# Patient Record
Sex: MALE | Race: WHITE | Employment: OTHER | URBAN - METROPOLITAN AREA
[De-identification: names, ages, dates, MRNs, and addresses within clinical notes are randomized per-mention and may not be internally consistent; named-entity substitution may affect disease eponyms.]

---

## 2023-10-13 LAB — DIABETIC RETINOPATHY: NEGATIVE

## 2023-12-13 LAB
CREATININE, URINE, EXTERNAL: 161.6
MICROALBUMIN URINE, EXTERNAL: 254.6
MICROALBUMIN/CREAT UR: 158 MG/G{CREAT}

## 2024-06-21 LAB
BUN BLDV-MCNC: 17 MG/DL
CALCIUM SERPL-MCNC: 9.5 MG/DL
CHLORIDE BLD-SCNC: 104 MMOL/L
CO2: 27 MMOL/L
CREAT SERPL-MCNC: 0.91 MG/DL
EGFR: 93
GLUCOSE BLD-MCNC: 165 MG/DL
POTASSIUM SERPL-SCNC: 4 MMOL/L
SODIUM BLD-SCNC: 139 MMOL/L

## 2024-06-22 LAB
CHOLESTEROL, TOTAL: 158 MG/DL
CHOLESTEROL/HDL RATIO: ABNORMAL
ESTIMATED AVERAGE GLUCOSE: 203
HBA1C MFR BLD: 8.7 %
HDLC SERPL-MCNC: 32 MG/DL (ref 35–70)
LDL CHOLESTEROL: 88
NONHDLC SERPL-MCNC: 126 MG/DL
TRIGL SERPL-MCNC: 188 MG/DL
VLDLC SERPL CALC-MCNC: ABNORMAL MG/DL

## 2024-07-15 ENCOUNTER — HOSPITAL ENCOUNTER (EMERGENCY)
Age: 69
Discharge: HOME | End: 2024-07-16
Attending: EMERGENCY MEDICINE

## 2024-07-15 ENCOUNTER — APPOINTMENT (OUTPATIENT)
Age: 69
End: 2024-07-15

## 2024-07-15 DIAGNOSIS — R31.9 HEMATURIA, UNSPECIFIED TYPE: ICD-10-CM

## 2024-07-15 DIAGNOSIS — R10.30 LOWER ABDOMINAL PAIN: Primary | ICD-10-CM

## 2024-07-15 LAB
ALBUMIN SERPL-MCNC: 3.9 G/DL (ref 3.4–5)
ALBUMIN/GLOB SERPL: 1.1 {RATIO}
ALP SERPL-CCNC: 101 U/L (ref 40–129)
ALT SERPL-CCNC: 19 U/L (ref 10–40)
ANION GAP SERPL CALCULATED.3IONS-SCNC: 12 MMOL/L (ref 3–16)
AST SERPL-CCNC: 19 U/L (ref 15–37)
BASOPHILS # BLD: 0.06 K/UL (ref 0–0.2)
BASOPHILS NFR BLD: 1 %
BILIRUB SERPL-MCNC: 0.5 MG/DL (ref 0–1)
BILIRUB UR QL STRIP: NEGATIVE
BUN SERPL-MCNC: 15 MG/DL (ref 7–20)
CALCIUM SERPL-MCNC: 8.9 MG/DL (ref 8.3–10.6)
CHARACTER UR: ABNORMAL
CHLORIDE SERPL-SCNC: 103 MMOL/L (ref 99–110)
CLARITY UR: ABNORMAL
CO2 SERPL-SCNC: 24 MMOL/L (ref 21–32)
COLOR UR: YELLOW
CREAT SERPL-MCNC: 0.8 MG/DL (ref 0.8–1.3)
EOSINOPHIL # BLD: 0.24 K/UL (ref 0–0.6)
EOSINOPHILS RELATIVE PERCENT: 2 %
ERYTHROCYTE [DISTWIDTH] IN BLOOD BY AUTOMATED COUNT: 12.3 % (ref 12.4–15.4)
GFR, ESTIMATED: >90 ML/MIN/1.73M2
GLUCOSE SERPL-MCNC: 176 MG/DL (ref 70–99)
GLUCOSE UR STRIP-MCNC: 100 MG/DL
HCT VFR BLD AUTO: 39.7 % (ref 40.5–52.5)
HGB BLD-MCNC: 13.9 G/DL (ref 13.5–17.5)
HGB UR QL STRIP.AUTO: ABNORMAL
IMM GRANULOCYTES # BLD AUTO: 0.01 K/UL (ref 0–0.5)
IMM GRANULOCYTES NFR BLD: 0 %
KETONES UR STRIP-MCNC: NEGATIVE MG/DL
LEUKOCYTE ESTERASE UR QL STRIP: NEGATIVE
LIPASE SERPL-CCNC: 56 U/L (ref 13–60)
LYMPHOCYTES NFR BLD: 3.5 K/UL (ref 1–5.1)
LYMPHOCYTES RELATIVE PERCENT: 36 %
MCH RBC QN AUTO: 30.8 PG (ref 26–34)
MCHC RBC AUTO-ENTMCNC: 35 G/DL (ref 31–36)
MCV RBC AUTO: 88 FL (ref 80–100)
MONOCYTES NFR BLD: 0.75 K/UL (ref 0–1.3)
MONOCYTES NFR BLD: 8 %
NEUTROPHILS NFR BLD: 54 %
NEUTS SEG NFR BLD: 5.29 K/UL (ref 1.7–7.7)
NITRITE UR QL STRIP: NEGATIVE
PH UR STRIP: 5.5 [PH] (ref 5–8)
PLATELET # BLD AUTO: 201 K/UL (ref 135–450)
PMV BLD AUTO: 9.9 FL
POTASSIUM SERPL-SCNC: 4.2 MMOL/L (ref 3.5–5.1)
PROT SERPL-MCNC: 7.4 G/DL (ref 6.4–8.2)
PROT UR STRIP-MCNC: 100 MG/DL
RBC # BLD AUTO: 4.51 M/UL (ref 4.2–5.9)
RBC #/AREA URNS HPF: ABNORMAL /HPF
SODIUM SERPL-SCNC: 139 MMOL/L (ref 136–145)
SP GR UR STRIP: >1.03 (ref 1–1.03)
UROBILINOGEN UR STRIP-ACNC: 2 EU/DL (ref 0–1)
WBC #/AREA URNS HPF: ABNORMAL /HPF
WBC OTHER # BLD: 9.9 K/UL (ref 4–11)

## 2024-07-15 PROCEDURE — 81001 URINALYSIS AUTO W/SCOPE: CPT

## 2024-07-15 PROCEDURE — 99284 EMERGENCY DEPT VISIT MOD MDM: CPT

## 2024-07-15 PROCEDURE — 83690 ASSAY OF LIPASE: CPT

## 2024-07-15 PROCEDURE — 74176 CT ABD & PELVIS W/O CONTRAST: CPT

## 2024-07-15 PROCEDURE — 80053 COMPREHEN METABOLIC PANEL: CPT

## 2024-07-15 PROCEDURE — 51798 US URINE CAPACITY MEASURE: CPT

## 2024-07-15 PROCEDURE — 51702 INSERT TEMP BLADDER CATH: CPT

## 2024-07-15 PROCEDURE — 85025 COMPLETE CBC W/AUTO DIFF WBC: CPT

## 2024-07-15 PROCEDURE — 6370000000 HC RX 637 (ALT 250 FOR IP): Performed by: EMERGENCY MEDICINE

## 2024-07-15 RX ORDER — LIDOCAINE HYDROCHLORIDE 20 MG/ML
JELLY TOPICAL ONCE
Status: COMPLETED | OUTPATIENT
Start: 2024-07-15 | End: 2024-07-15

## 2024-07-15 RX ORDER — ATORVASTATIN CALCIUM 40 MG/1
40 TABLET, FILM COATED ORAL DAILY
COMMUNITY
Start: 2023-11-29

## 2024-07-15 RX ORDER — ASPIRIN 325 MG
325 TABLET ORAL DAILY
COMMUNITY
Start: 2023-11-13

## 2024-07-15 RX ORDER — CARVEDILOL 6.25 MG/1
6.25 TABLET ORAL 2 TIMES DAILY WITH MEALS
COMMUNITY
Start: 2024-05-08

## 2024-07-15 RX ORDER — LISINOPRIL 10 MG/1
10 TABLET ORAL DAILY
COMMUNITY
Start: 2023-11-29

## 2024-07-15 RX ORDER — 0.9 % SODIUM CHLORIDE 0.9 %
500 INTRAVENOUS SOLUTION INTRAVENOUS ONCE
Status: DISCONTINUED | OUTPATIENT
Start: 2024-07-16 | End: 2024-07-16

## 2024-07-15 RX ORDER — PHENAZOPYRIDINE HYDROCHLORIDE 100 MG/1
200 TABLET, FILM COATED ORAL ONCE
Status: COMPLETED | OUTPATIENT
Start: 2024-07-16 | End: 2024-07-16

## 2024-07-15 RX ORDER — MORPHINE SULFATE 2 MG/ML
2 INJECTION, SOLUTION INTRAMUSCULAR; INTRAVENOUS ONCE
Status: DISCONTINUED | OUTPATIENT
Start: 2024-07-16 | End: 2024-07-16

## 2024-07-15 RX ORDER — TAMSULOSIN HYDROCHLORIDE 0.4 MG/1
0.4 CAPSULE ORAL DAILY
COMMUNITY
Start: 2023-11-29

## 2024-07-15 RX ADMIN — LIDOCAINE HYDROCHLORIDE: 20 JELLY TOPICAL at 22:36

## 2024-07-15 ASSESSMENT — PAIN DESCRIPTION - ORIENTATION: ORIENTATION: LEFT;UPPER

## 2024-07-15 ASSESSMENT — PAIN DESCRIPTION - FREQUENCY: FREQUENCY: CONTINUOUS

## 2024-07-15 ASSESSMENT — PAIN DESCRIPTION - LOCATION: LOCATION: CHEST;NECK

## 2024-07-15 ASSESSMENT — LIFESTYLE VARIABLES
HOW OFTEN DO YOU HAVE A DRINK CONTAINING ALCOHOL: NEVER
HOW MANY STANDARD DRINKS CONTAINING ALCOHOL DO YOU HAVE ON A TYPICAL DAY: PATIENT DOES NOT DRINK

## 2024-07-15 ASSESSMENT — PAIN - FUNCTIONAL ASSESSMENT
PAIN_FUNCTIONAL_ASSESSMENT: ACTIVITIES ARE NOT PREVENTED
PAIN_FUNCTIONAL_ASSESSMENT: NONE - DENIES PAIN

## 2024-07-15 ASSESSMENT — PAIN DESCRIPTION - PAIN TYPE: TYPE: ACUTE PAIN

## 2024-07-15 ASSESSMENT — PAIN DESCRIPTION - DESCRIPTORS: DESCRIPTORS: TIGHTNESS

## 2024-07-15 ASSESSMENT — PAIN DESCRIPTION - ONSET: ONSET: ON-GOING

## 2024-07-15 ASSESSMENT — PAIN SCALES - GENERAL: PAINLEVEL_OUTOF10: 4

## 2024-07-16 VITALS
BODY MASS INDEX: 29.24 KG/M2 | HEIGHT: 67 IN | SYSTOLIC BLOOD PRESSURE: 167 MMHG | DIASTOLIC BLOOD PRESSURE: 86 MMHG | RESPIRATION RATE: 18 BRPM | HEART RATE: 65 BPM | TEMPERATURE: 98.4 F | OXYGEN SATURATION: 96 % | WEIGHT: 186.3 LBS

## 2024-07-16 PROCEDURE — 6370000000 HC RX 637 (ALT 250 FOR IP): Performed by: EMERGENCY MEDICINE

## 2024-07-16 RX ORDER — PHENAZOPYRIDINE HYDROCHLORIDE 200 MG/1
200 TABLET, FILM COATED ORAL 3 TIMES DAILY PRN
Qty: 6 TABLET | Refills: 0 | Status: SHIPPED | OUTPATIENT
Start: 2024-07-16

## 2024-07-16 RX ORDER — OXYCODONE HYDROCHLORIDE AND ACETAMINOPHEN 5; 325 MG/1; MG/1
1 TABLET ORAL ONCE
Status: COMPLETED | OUTPATIENT
Start: 2024-07-16 | End: 2024-07-16

## 2024-07-16 RX ORDER — OXYCODONE HYDROCHLORIDE AND ACETAMINOPHEN 5; 325 MG/1; MG/1
1 TABLET ORAL EVERY 6 HOURS PRN
Qty: 12 TABLET | Refills: 0 | Status: SHIPPED | OUTPATIENT
Start: 2024-07-16 | End: 2024-07-19

## 2024-07-16 RX ORDER — CIPROFLOXACIN 500 MG/1
500 TABLET, FILM COATED ORAL 2 TIMES DAILY
Qty: 14 TABLET | Refills: 0 | Status: SHIPPED | OUTPATIENT
Start: 2024-07-16 | End: 2024-07-23

## 2024-07-16 RX ADMIN — PHENAZOPYRIDINE HYDROCHLORIDE 200 MG: 100 TABLET ORAL at 00:09

## 2024-07-16 RX ADMIN — OXYCODONE HYDROCHLORIDE AND ACETAMINOPHEN 1 TABLET: 5; 325 TABLET ORAL at 00:15

## 2024-07-16 NOTE — ED PROVIDER NOTES
[facility] EMERGENCY DEPT     EMERGENCY DEPARTMENT ENCOUNTER            Pt Name: Matt Esteban   MRN: [id number]   Birthdate 1955   Date of evaluation: 7/15/2024   Provider: Radha Sanchez DO   PCP: Galo Stewart MD   Note Started: 5:34 AM EDT 7/16/24          CHIEF COMPLAINT     Chief Complaint   Patient presents with    Urinary Retention     Pt presents to the ED with c/o inability to pee. Pt reports hx of frequent UTIs and \"prostate issues\".     Abdominal Pain             HISTORY OF PRESENT ILLNESS:   History from : Patient   Limitations to history : None     Matt Esteban is a 68 y.o. male who presents to emergency department with complaints of lower abdominal pain urinary frequency and difficulty voiding onset 3 days ago constant since that time.  Patient reports a weak urinary stream for more than a year.  He states over the last 3 days however he has been urinating more frequently and his urine stream is much weaker than usual but he has been able to pass urine..  He also reports pain with urination.  He denies fever or vomiting.  He does report associated occasional low back pain.  He states he has never seen urologist.    Nursing Notes were all reviewed and agreed with, or any disagreements were addressed in the HPI.     REVIEW OF SYSTEMS :    Positives and Pertinent negatives as per HPI.      MEDICAL HISTORY   has a past medical history of UTI (urinary tract infection).    Past Surgical History:   Procedure Laterality Date    APPENDECTOMY      CHOLECYSTECTOMY        CURRENTMEDICATIONS       Discharge Medication List as of 7/16/2024 12:23 AM        CONTINUE these medications which have NOT CHANGED    Details   metFORMIN (GLUCOPHAGE) 500 MG tablet Take 1 tablet by mouth 2 times daily (with meals)Historical Med      tamsulosin (FLOMAX) 0.4 MG capsule Take 1 capsule by mouth dailyHistorical Med      lisinopril (PRINIVIL;ZESTRIL) 10 MG tablet Take 1 tablet by mouth dailyHistorical Med

## 2024-07-16 NOTE — DISCHARGE INSTRUCTIONS
Return to emergency department if worsening abdominal pain fever vomiting difficulty urinating worse in any way or any problems.

## 2024-07-16 NOTE — ED NOTES

## 2024-12-03 ENCOUNTER — OFFICE VISIT (OUTPATIENT)
Dept: PRIMARY CARE CLINIC | Age: 69
End: 2024-12-03
Payer: MEDICARE

## 2024-12-03 VITALS
OXYGEN SATURATION: 95 % | HEART RATE: 79 BPM | DIASTOLIC BLOOD PRESSURE: 70 MMHG | SYSTOLIC BLOOD PRESSURE: 122 MMHG | RESPIRATION RATE: 18 BRPM | BODY MASS INDEX: 29.48 KG/M2 | TEMPERATURE: 97.8 F | WEIGHT: 188.2 LBS

## 2024-12-03 DIAGNOSIS — G89.29 CHRONIC BILATERAL LOW BACK PAIN WITHOUT SCIATICA: ICD-10-CM

## 2024-12-03 DIAGNOSIS — E78.2 MIXED HYPERLIPIDEMIA: ICD-10-CM

## 2024-12-03 DIAGNOSIS — R33.9 URINARY RETENTION: ICD-10-CM

## 2024-12-03 DIAGNOSIS — E11.9 TYPE 2 DIABETES MELLITUS WITHOUT COMPLICATION, WITHOUT LONG-TERM CURRENT USE OF INSULIN (HCC): Primary | ICD-10-CM

## 2024-12-03 DIAGNOSIS — N13.8 BENIGN PROSTATIC HYPERPLASIA WITH URINARY OBSTRUCTION: ICD-10-CM

## 2024-12-03 DIAGNOSIS — Z12.5 ENCOUNTER FOR SCREENING FOR MALIGNANT NEOPLASM OF PROSTATE: ICD-10-CM

## 2024-12-03 DIAGNOSIS — R39.15 URINARY URGENCY: ICD-10-CM

## 2024-12-03 DIAGNOSIS — M54.50 CHRONIC BILATERAL LOW BACK PAIN WITHOUT SCIATICA: ICD-10-CM

## 2024-12-03 DIAGNOSIS — N40.1 BENIGN PROSTATIC HYPERPLASIA WITH URINARY OBSTRUCTION: ICD-10-CM

## 2024-12-03 PROBLEM — I25.83 CORONARY ARTERY DISEASE DUE TO LIPID RICH PLAQUE: Status: ACTIVE | Noted: 2022-12-21

## 2024-12-03 PROBLEM — R91.1 SOLITARY PULMONARY NODULE: Status: ACTIVE | Noted: 2024-01-04

## 2024-12-03 PROBLEM — R42 DIZZINESS AND GIDDINESS: Status: RESOLVED | Noted: 2024-01-10 | Resolved: 2024-12-03

## 2024-12-03 PROBLEM — R42 DIZZINESS AND GIDDINESS: Status: ACTIVE | Noted: 2024-01-10

## 2024-12-03 PROBLEM — I10 ESSENTIAL HYPERTENSION: Status: ACTIVE | Noted: 2022-12-21

## 2024-12-03 PROBLEM — Z72.0 TOBACCO ABUSE: Status: ACTIVE | Noted: 2022-12-21

## 2024-12-03 PROBLEM — I25.10 CORONARY ARTERY DISEASE DUE TO LIPID RICH PLAQUE: Status: ACTIVE | Noted: 2022-12-21

## 2024-12-03 LAB
ALBUMIN SERPL-MCNC: 4.3 G/DL (ref 3.4–5)
ALBUMIN/GLOB SERPL: 1.3 {RATIO} (ref 1.1–2.2)
ALP SERPL-CCNC: 94 U/L (ref 40–129)
ALT SERPL-CCNC: 25 U/L (ref 10–40)
ANION GAP SERPL CALCULATED.3IONS-SCNC: 9 MMOL/L (ref 3–16)
AST SERPL-CCNC: 20 U/L (ref 15–37)
BACTERIA URNS QL MICRO: ABNORMAL /HPF
BILIRUB SERPL-MCNC: 1.1 MG/DL (ref 0–1)
BILIRUB UR QL STRIP.AUTO: NEGATIVE
BUN SERPL-MCNC: 22 MG/DL (ref 7–20)
CALCIUM SERPL-MCNC: 9.6 MG/DL (ref 8.3–10.6)
CHLORIDE SERPL-SCNC: 103 MMOL/L (ref 99–110)
CHOLEST SERPL-MCNC: 190 MG/DL (ref 0–199)
CLARITY UR: ABNORMAL
CO2 SERPL-SCNC: 27 MMOL/L (ref 21–32)
COLOR UR: YELLOW
CREAT SERPL-MCNC: 0.8 MG/DL (ref 0.8–1.3)
CREAT UR-MCNC: 218 MG/DL (ref 39–259)
DEPRECATED RDW RBC AUTO: 13.7 % (ref 12.4–15.4)
EPI CELLS #/AREA URNS AUTO: 1 /HPF (ref 0–5)
EST. AVERAGE GLUCOSE BLD GHB EST-MCNC: 177.2 MG/DL
FOLATE SERPL-MCNC: 7.15 NG/ML (ref 4.78–24.2)
GFR SERPLBLD CREATININE-BSD FMLA CKD-EPI: >90 ML/MIN/{1.73_M2}
GLUCOSE SERPL-MCNC: 183 MG/DL (ref 70–99)
GLUCOSE UR STRIP.AUTO-MCNC: NEGATIVE MG/DL
HBA1C MFR BLD: 7.8 %
HCT VFR BLD AUTO: 42.9 % (ref 40.5–52.5)
HCV AB SERPL QL IA: NORMAL
HDLC SERPL-MCNC: 34 MG/DL (ref 40–60)
HGB BLD-MCNC: 15.1 G/DL (ref 13.5–17.5)
HGB UR QL STRIP.AUTO: NEGATIVE
HYALINE CASTS #/AREA URNS AUTO: 1 /LPF (ref 0–8)
KETONES UR STRIP.AUTO-MCNC: NEGATIVE MG/DL
LDLC SERPL CALC-MCNC: 125 MG/DL
LEUKOCYTE ESTERASE UR QL STRIP.AUTO: NEGATIVE
MCH RBC QN AUTO: 31.6 PG (ref 26–34)
MCHC RBC AUTO-ENTMCNC: 35.3 G/DL (ref 31–36)
MCV RBC AUTO: 89.6 FL (ref 80–100)
MICROALBUMIN UR DL<=1MG/L-MCNC: 126 MG/DL
MICROALBUMIN/CREAT UR: 578 MG/G (ref 0–30)
NITRITE UR QL STRIP.AUTO: NEGATIVE
PH UR STRIP.AUTO: 5 [PH] (ref 5–8)
PLATELET # BLD AUTO: 174 K/UL (ref 135–450)
PMV BLD AUTO: 10.3 FL (ref 5–10.5)
POTASSIUM SERPL-SCNC: 5.2 MMOL/L (ref 3.5–5.1)
PROT SERPL-MCNC: 7.6 G/DL (ref 6.4–8.2)
PROT UR STRIP.AUTO-MCNC: 300 MG/DL
PSA SERPL DL<=0.01 NG/ML-MCNC: 2.18 NG/ML (ref 0–4)
RBC # BLD AUTO: 4.78 M/UL (ref 4.2–5.9)
RBC CLUMPS #/AREA URNS AUTO: 1 /HPF (ref 0–4)
SODIUM SERPL-SCNC: 139 MMOL/L (ref 136–145)
SP GR UR STRIP.AUTO: 1.03 (ref 1–1.03)
TRIGL SERPL-MCNC: 155 MG/DL (ref 0–150)
TSH SERPL DL<=0.005 MIU/L-ACNC: 1.46 UIU/ML (ref 0.27–4.2)
UA DIPSTICK W REFLEX MICRO PNL UR: YES
URN SPEC COLLECT METH UR: ABNORMAL
UROBILINOGEN UR STRIP-ACNC: 0.2 E.U./DL
VIT B12 SERPL-MCNC: 668 PG/ML (ref 211–911)
VLDLC SERPL CALC-MCNC: 31 MG/DL
WBC # BLD AUTO: 8.9 K/UL (ref 4–11)
WBC #/AREA URNS AUTO: 1 /HPF (ref 0–5)

## 2024-12-03 PROCEDURE — 99205 OFFICE O/P NEW HI 60 MIN: CPT | Performed by: FAMILY MEDICINE

## 2024-12-03 PROCEDURE — 3078F DIAST BP <80 MM HG: CPT | Performed by: FAMILY MEDICINE

## 2024-12-03 PROCEDURE — 3051F HG A1C>EQUAL 7.0%<8.0%: CPT | Performed by: FAMILY MEDICINE

## 2024-12-03 PROCEDURE — 1123F ACP DISCUSS/DSCN MKR DOCD: CPT | Performed by: FAMILY MEDICINE

## 2024-12-03 PROCEDURE — 36415 COLL VENOUS BLD VENIPUNCTURE: CPT | Performed by: FAMILY MEDICINE

## 2024-12-03 PROCEDURE — 3074F SYST BP LT 130 MM HG: CPT | Performed by: FAMILY MEDICINE

## 2024-12-03 RX ORDER — GLYBURIDE 5 MG/1
5 TABLET ORAL
COMMUNITY
Start: 2024-10-21 | End: 2024-12-03

## 2024-12-03 RX ORDER — TIZANIDINE 2 MG/1
2 TABLET ORAL 3 TIMES DAILY PRN
Qty: 60 TABLET | Refills: 0 | Status: SHIPPED | OUTPATIENT
Start: 2024-12-03

## 2024-12-03 RX ORDER — AZELASTINE HYDROCHLORIDE 137 UG/1
1 SPRAY, METERED NASAL 2 TIMES DAILY
COMMUNITY
Start: 2024-07-03

## 2024-12-03 SDOH — ECONOMIC STABILITY: INCOME INSECURITY: HOW HARD IS IT FOR YOU TO PAY FOR THE VERY BASICS LIKE FOOD, HOUSING, MEDICAL CARE, AND HEATING?: NOT HARD AT ALL

## 2024-12-03 SDOH — ECONOMIC STABILITY: FOOD INSECURITY: WITHIN THE PAST 12 MONTHS, THE FOOD YOU BOUGHT JUST DIDN'T LAST AND YOU DIDN'T HAVE MONEY TO GET MORE.: NEVER TRUE

## 2024-12-03 SDOH — ECONOMIC STABILITY: FOOD INSECURITY: WITHIN THE PAST 12 MONTHS, YOU WORRIED THAT YOUR FOOD WOULD RUN OUT BEFORE YOU GOT MONEY TO BUY MORE.: NEVER TRUE

## 2024-12-03 ASSESSMENT — PATIENT HEALTH QUESTIONNAIRE - PHQ9
SUM OF ALL RESPONSES TO PHQ QUESTIONS 1-9: 9
5. POOR APPETITE OR OVEREATING: NOT AT ALL
1. LITTLE INTEREST OR PLEASURE IN DOING THINGS: SEVERAL DAYS
8. MOVING OR SPEAKING SO SLOWLY THAT OTHER PEOPLE COULD HAVE NOTICED. OR THE OPPOSITE, BEING SO FIGETY OR RESTLESS THAT YOU HAVE BEEN MOVING AROUND A LOT MORE THAN USUAL: NOT AT ALL
9. THOUGHTS THAT YOU WOULD BE BETTER OFF DEAD, OR OF HURTING YOURSELF: NOT AT ALL
10. IF YOU CHECKED OFF ANY PROBLEMS, HOW DIFFICULT HAVE THESE PROBLEMS MADE IT FOR YOU TO DO YOUR WORK, TAKE CARE OF THINGS AT HOME, OR GET ALONG WITH OTHER PEOPLE: SOMEWHAT DIFFICULT
7. TROUBLE CONCENTRATING ON THINGS, SUCH AS READING THE NEWSPAPER OR WATCHING TELEVISION: NOT AT ALL
SUM OF ALL RESPONSES TO PHQ QUESTIONS 1-9: 9
2. FEELING DOWN, DEPRESSED OR HOPELESS: MORE THAN HALF THE DAYS
4. FEELING TIRED OR HAVING LITTLE ENERGY: MORE THAN HALF THE DAYS
SUM OF ALL RESPONSES TO PHQ QUESTIONS 1-9: 9
SUM OF ALL RESPONSES TO PHQ9 QUESTIONS 1 & 2: 3
6. FEELING BAD ABOUT YOURSELF - OR THAT YOU ARE A FAILURE OR HAVE LET YOURSELF OR YOUR FAMILY DOWN: SEVERAL DAYS
3. TROUBLE FALLING OR STAYING ASLEEP: NEARLY EVERY DAY
SUM OF ALL RESPONSES TO PHQ QUESTIONS 1-9: 9

## 2024-12-03 NOTE — PROGRESS NOTES
cervical adenopathy.   Neurological:      General: No focal deficit present.      Mental Status: He is alert and oriented to person, place, and time.      Cranial Nerves: No cranial nerve deficit.   Psychiatric:         Attention and Perception: Attention and perception normal.         Mood and Affect: Mood and affect normal.         Speech: Speech normal.         Behavior: Behavior normal. Behavior is cooperative.         Thought Content: Thought content normal.         Cognition and Memory: Cognition and memory normal.         Judgment: Judgment normal.         ASSESSMENT:  1. Type 2 diabetes mellitus without complication, without long-term current use of insulin (HCC)    2. Mixed hyperlipidemia    3. Urinary urgency    4. Benign prostatic hyperplasia with urinary obstruction    5. Chronic bilateral low back pain without sciatica    6. Encounter for screening for malignant neoplasm of prostate    7. Urinary retention         PLAN:   1. Type 2 diabetes mellitus without complication, without long-term current use of insulin (HCC)  -     Comprehensive Metabolic Panel  -     CBC  -     Lipid Panel  -     Hemoglobin A1C  -     PSA Screening  -     Hepatitis C Antibody  -     TSH with Reflex  -     Vitamin B12 & Folate  -     MICROALBUMIN / CREATININE URINE RATIO  2. Mixed hyperlipidemia  -     Comprehensive Metabolic Panel  -     CBC  -     Lipid Panel  -     Hemoglobin A1C  -     PSA Screening  -     Hepatitis C Antibody  -     TSH with Reflex  -     Vitamin B12 & Folate  3. Urinary urgency  -     Culture, Urine  -     Urinalysis with Microscopic  4. Benign prostatic hyperplasia with urinary obstruction  -     JOSE R - Aime Mohan MD, Urology, [place]  5. Chronic bilateral low back pain without sciatica  -     diclofenac sodium (VOLTAREN) 1 % GEL; Apply 2 g topically 4 times daily, Topical, 4 TIMES DAILY Starting Tue 12/3/2024, Disp-100 g, R-3, Normal  -     tiZANidine (ZANAFLEX) 2 MG tablet; Take 1 tablet by

## 2024-12-04 LAB — BACTERIA UR CULT: NORMAL

## 2024-12-05 DIAGNOSIS — E11.29 TYPE 2 DIABETES MELLITUS WITH DIABETIC MICROALBUMINURIA, WITHOUT LONG-TERM CURRENT USE OF INSULIN (HCC): Primary | ICD-10-CM

## 2024-12-05 DIAGNOSIS — R80.9 TYPE 2 DIABETES MELLITUS WITH DIABETIC MICROALBUMINURIA, WITHOUT LONG-TERM CURRENT USE OF INSULIN (HCC): Primary | ICD-10-CM

## 2024-12-05 RX ORDER — ATORVASTATIN CALCIUM 80 MG/1
80 TABLET, FILM COATED ORAL DAILY
Qty: 90 TABLET | Refills: 0 | Status: SHIPPED | OUTPATIENT
Start: 2024-12-05

## 2024-12-05 RX ORDER — LISINOPRIL 10 MG/1
10 TABLET ORAL DAILY
Qty: 90 TABLET | Refills: 0 | Status: SHIPPED | OUTPATIENT
Start: 2024-12-05

## 2025-01-06 SDOH — HEALTH STABILITY: PHYSICAL HEALTH: ON AVERAGE, HOW MANY DAYS PER WEEK DO YOU ENGAGE IN MODERATE TO STRENUOUS EXERCISE (LIKE A BRISK WALK)?: 7 DAYS

## 2025-01-06 SDOH — HEALTH STABILITY: PHYSICAL HEALTH: ON AVERAGE, HOW MANY MINUTES DO YOU ENGAGE IN EXERCISE AT THIS LEVEL?: 150+ MIN

## 2025-01-06 ASSESSMENT — PATIENT HEALTH QUESTIONNAIRE - PHQ9
SUM OF ALL RESPONSES TO PHQ QUESTIONS 1-9: 0
1. LITTLE INTEREST OR PLEASURE IN DOING THINGS: NOT AT ALL
SUM OF ALL RESPONSES TO PHQ QUESTIONS 1-9: 0
SUM OF ALL RESPONSES TO PHQ QUESTIONS 1-9: 0
2. FEELING DOWN, DEPRESSED OR HOPELESS: NOT AT ALL
SUM OF ALL RESPONSES TO PHQ9 QUESTIONS 1 & 2: 0
SUM OF ALL RESPONSES TO PHQ QUESTIONS 1-9: 0

## 2025-01-06 ASSESSMENT — LIFESTYLE VARIABLES
HOW OFTEN DURING THE LAST YEAR HAVE YOU BEEN UNABLE TO REMEMBER WHAT HAPPENED THE NIGHT BEFORE BECAUSE YOU HAD BEEN DRINKING: NEVER
HOW MANY STANDARD DRINKS CONTAINING ALCOHOL DO YOU HAVE ON A TYPICAL DAY: 1 OR 2
HOW OFTEN DURING THE LAST YEAR HAVE YOU FOUND THAT YOU WERE NOT ABLE TO STOP DRINKING ONCE YOU HAD STARTED: NEVER
HAVE YOU OR SOMEONE ELSE BEEN INJURED AS A RESULT OF YOUR DRINKING: NO
HAVE YOU OR SOMEONE ELSE BEEN INJURED AS A RESULT OF YOUR DRINKING: NO
HAS A RELATIVE, FRIEND, DOCTOR, OR ANOTHER HEALTH PROFESSIONAL EXPRESSED CONCERN ABOUT YOUR DRINKING OR SUGGESTED YOU CUT DOWN: NO
HOW MANY STANDARD DRINKS CONTAINING ALCOHOL DO YOU HAVE ON A TYPICAL DAY: 1
HOW OFTEN DURING THE LAST YEAR HAVE YOU NEEDED AN ALCOHOLIC DRINK FIRST THING IN THE MORNING TO GET YOURSELF GOING AFTER A NIGHT OF HEAVY DRINKING: NEVER
HOW OFTEN DURING THE LAST YEAR HAVE YOU HAD A FEELING OF GUILT OR REMORSE AFTER DRINKING: NEVER
HOW OFTEN DURING THE LAST YEAR HAVE YOU FAILED TO DO WHAT WAS NORMALLY EXPECTED FROM YOU BECAUSE OF DRINKING: NEVER
HOW OFTEN DO YOU HAVE A DRINK CONTAINING ALCOHOL: MONTHLY OR LESS
HOW OFTEN DURING THE LAST YEAR HAVE YOU HAD A FEELING OF GUILT OR REMORSE AFTER DRINKING: NEVER
HOW OFTEN DURING THE LAST YEAR HAVE YOU BEEN UNABLE TO REMEMBER WHAT HAPPENED THE NIGHT BEFORE BECAUSE YOU HAD BEEN DRINKING: NEVER
HOW OFTEN DO YOU HAVE A DRINK CONTAINING ALCOHOL: 2
HOW OFTEN DURING THE LAST YEAR HAVE YOU FOUND THAT YOU WERE NOT ABLE TO STOP DRINKING ONCE YOU HAD STARTED: NEVER
HOW OFTEN DURING THE LAST YEAR HAVE YOU NEEDED AN ALCOHOLIC DRINK FIRST THING IN THE MORNING TO GET YOURSELF GOING AFTER A NIGHT OF HEAVY DRINKING: NEVER
HOW OFTEN DO YOU HAVE SIX OR MORE DRINKS ON ONE OCCASION: 2
HOW OFTEN DURING THE LAST YEAR HAVE YOU FAILED TO DO WHAT WAS NORMALLY EXPECTED FROM YOU BECAUSE OF DRINKING: NEVER
HAS A RELATIVE, FRIEND, DOCTOR, OR ANOTHER HEALTH PROFESSIONAL EXPRESSED CONCERN ABOUT YOUR DRINKING OR SUGGESTED YOU CUT DOWN: NO

## 2025-01-09 ENCOUNTER — OFFICE VISIT (OUTPATIENT)
Dept: PRIMARY CARE CLINIC | Age: 70
End: 2025-01-09

## 2025-01-09 VITALS
HEIGHT: 67 IN | DIASTOLIC BLOOD PRESSURE: 88 MMHG | OXYGEN SATURATION: 97 % | BODY MASS INDEX: 29.66 KG/M2 | WEIGHT: 189 LBS | SYSTOLIC BLOOD PRESSURE: 138 MMHG | HEART RATE: 76 BPM | RESPIRATION RATE: 20 BRPM | TEMPERATURE: 97.8 F

## 2025-01-09 DIAGNOSIS — E11.9 TYPE 2 DIABETES MELLITUS WITHOUT COMPLICATION, WITHOUT LONG-TERM CURRENT USE OF INSULIN (HCC): ICD-10-CM

## 2025-01-09 DIAGNOSIS — Z12.11 SCREENING FOR MALIGNANT NEOPLASM OF COLON: ICD-10-CM

## 2025-01-09 DIAGNOSIS — I10 ESSENTIAL HYPERTENSION: ICD-10-CM

## 2025-01-09 DIAGNOSIS — Z87.891 PERSONAL HISTORY OF TOBACCO USE: ICD-10-CM

## 2025-01-09 DIAGNOSIS — Z00.00 WELCOME TO MEDICARE PREVENTIVE VISIT: Primary | ICD-10-CM

## 2025-01-09 DIAGNOSIS — E78.2 MIXED HYPERLIPIDEMIA: ICD-10-CM

## 2025-01-09 DIAGNOSIS — J01.00 ACUTE NON-RECURRENT MAXILLARY SINUSITIS: ICD-10-CM

## 2025-01-09 RX ORDER — AZITHROMYCIN 250 MG/1
TABLET, FILM COATED ORAL
Qty: 6 TABLET | Refills: 0 | Status: SHIPPED | OUTPATIENT
Start: 2025-01-09

## 2025-01-09 SDOH — ECONOMIC STABILITY: FOOD INSECURITY: WITHIN THE PAST 12 MONTHS, YOU WORRIED THAT YOUR FOOD WOULD RUN OUT BEFORE YOU GOT MONEY TO BUY MORE.: NEVER TRUE

## 2025-01-09 SDOH — ECONOMIC STABILITY: FOOD INSECURITY: WITHIN THE PAST 12 MONTHS, THE FOOD YOU BOUGHT JUST DIDN'T LAST AND YOU DIDN'T HAVE MONEY TO GET MORE.: NEVER TRUE

## 2025-01-09 NOTE — PROGRESS NOTES
Medicare Annual Wellness Visit    Matt Esteban is here for Medicare AWV    Assessment & Plan   Welcome to Medicare preventive visit  -     Visual acuity screening  Type 2 diabetes mellitus without complication, without long-term current use of insulin (HCC)  Mixed hyperlipidemia  -     Lipid Panel  -     Hepatic Function Panel  Screening for malignant neoplasm of colon  -     Cologuard (Fecal DNA Colorectal Cancer Screening)  Personal history of tobacco use  -     CO VISIT TO DISCUSS LUNG CA SCREEN W LDCT  -     CT Lung Screen (Annual); Future  Acute non-recurrent maxillary sinusitis  -     azithromycin (ZITHROMAX) 250 MG tablet; Take 2 tabs (500 mg) on Day 1, and take 1 tab (250 mg) on days 2 through 5., Disp-6 tablet, R-0Normal  Essential hypertension  -     lisinopril (PRINIVIL;ZESTRIL) 10 MG tablet; Take 1 tablet by mouth daily, Disp-90 tablet, R-0Normal  Do recommend nasal saline, vaporizer and other supportive measures for sinus infection  He does agree to complete Cologuard kit for colon cancer screening  Blood pressure is controlled in office today  Assess labs and adjust treatment for his lipids as needed  No change in diabetic regimen at this time     Return in about 3 months (around 4/9/2025).     Subjective   The following acute and/or chronic problems were also addressed today:    He was in his usual state of health until approximately 10 days ago when he had onset of nasal congestion slight sore throat and occasional cough.  Since that time symptoms had improved however congestion has worsened and now has pain across his face and into his teeth    Diabetes-he reports he was diagnosed in the 1990s initially nutrition changes were all that required but has been on metformin for some time now  He notes no difficulty with this medication  No other medications have been used  He notes his fasting blood sugars can vary and were much higher earlier in the year until he restarted his medication  Denies

## 2025-01-09 NOTE — PATIENT INSTRUCTIONS

## 2025-01-10 LAB
ALBUMIN SERPL-MCNC: 4.3 G/DL (ref 3.4–5)
ALP SERPL-CCNC: 107 U/L (ref 40–129)
ALT SERPL-CCNC: 28 U/L (ref 10–40)
AST SERPL-CCNC: 22 U/L (ref 15–37)
BILIRUB DIRECT SERPL-MCNC: 0.3 MG/DL (ref 0–0.3)
BILIRUB INDIRECT SERPL-MCNC: 0.5 MG/DL (ref 0–1)
BILIRUB SERPL-MCNC: 0.8 MG/DL (ref 0–1)
CHOLEST SERPL-MCNC: 192 MG/DL (ref 0–199)
HDLC SERPL-MCNC: 32 MG/DL (ref 40–60)
LDLC SERPL CALC-MCNC: 121 MG/DL
PROT SERPL-MCNC: 7.8 G/DL (ref 6.4–8.2)
TRIGL SERPL-MCNC: 196 MG/DL (ref 0–150)
VLDLC SERPL CALC-MCNC: 39 MG/DL

## 2025-01-15 DIAGNOSIS — E78.2 MIXED HYPERLIPIDEMIA: Primary | ICD-10-CM

## 2025-01-15 RX ORDER — ATORVASTATIN CALCIUM 80 MG/1
80 TABLET, FILM COATED ORAL DAILY
Qty: 90 TABLET | Refills: 1 | Status: SHIPPED | OUTPATIENT
Start: 2025-01-15

## 2025-01-15 RX ORDER — EZETIMIBE 10 MG/1
10 TABLET ORAL DAILY
Qty: 90 TABLET | Refills: 1 | Status: SHIPPED | OUTPATIENT
Start: 2025-01-15

## 2025-01-15 RX ORDER — LISINOPRIL 10 MG/1
10 TABLET ORAL DAILY
Qty: 90 TABLET | Refills: 0 | Status: SHIPPED | OUTPATIENT
Start: 2025-01-15

## 2025-01-20 ENCOUNTER — TELEPHONE (OUTPATIENT)
Dept: PRIMARY CARE CLINIC | Age: 70
End: 2025-01-20

## 2025-01-20 NOTE — TELEPHONE ENCOUNTER
Lab result notes:    Saadia Mendez  1/17/2025  1:06 PM EST     Read note to pt per the provider.  Pt states to send medication to his local Pharmacy, [facility].          Chely Cerrato MD  1/15/2025  2:22 PM EST     Because his cholesterol is not controlled on a very good dose of the atorvastatin I do recommend using 2 medications for his cholesterol he will continue the atorvastatin and begin Zetia this is a once a day medication.  This does not tend to have any side effects.  If he finds that this is expensive at his local pharmacy there is a mail order pharmacy that offers it at a much better rate do let me know and we can get that sent there.

## 2025-01-24 NOTE — TELEPHONE ENCOUNTER
The medication was already sent to his pharmacy on the day the labs were reviewed. If cost is high would do mail order

## 2025-01-27 DIAGNOSIS — G89.29 CHRONIC BILATERAL LOW BACK PAIN WITHOUT SCIATICA: ICD-10-CM

## 2025-01-27 DIAGNOSIS — M54.50 CHRONIC BILATERAL LOW BACK PAIN WITHOUT SCIATICA: ICD-10-CM

## 2025-01-27 RX ORDER — TIZANIDINE 2 MG/1
2 TABLET ORAL 3 TIMES DAILY PRN
Qty: 60 TABLET | Refills: 0 | Status: SHIPPED | OUTPATIENT
Start: 2025-01-27

## 2025-01-27 NOTE — TELEPHONE ENCOUNTER
Medication:   Requested Prescriptions     Pending Prescriptions Disp Refills    tiZANidine (ZANAFLEX) 2 MG tablet [Pharmacy Med Name: TIZANIDINE HCL 2 MG TABLET] 60 tablet 0     Sig: TAKE 1 TABLET BY MOUTH 3 TIMES DAILY AS NEEDED (BACK PAIN)        Last Filled:  12.03.2024 # 60     Patient Phone Number: [phone] (home)     Last appt: 1/9/2025   Next appt: 4/10/2025    Last OARRS:        No data to display

## 2025-03-05 ENCOUNTER — OFFICE VISIT (OUTPATIENT)
Dept: URGENT CARE | Age: 70
End: 2025-03-05

## 2025-03-05 VITALS
SYSTOLIC BLOOD PRESSURE: 132 MMHG | DIASTOLIC BLOOD PRESSURE: 82 MMHG | RESPIRATION RATE: 18 BRPM | WEIGHT: 186 LBS | HEIGHT: 67 IN | HEART RATE: 81 BPM | TEMPERATURE: 97.8 F | OXYGEN SATURATION: 96 % | BODY MASS INDEX: 29.19 KG/M2

## 2025-03-05 DIAGNOSIS — J06.9 URI WITH COUGH AND CONGESTION: ICD-10-CM

## 2025-03-05 DIAGNOSIS — I10 ELEVATED BLOOD PRESSURE READING IN OFFICE WITH DIAGNOSIS OF HYPERTENSION: ICD-10-CM

## 2025-03-05 DIAGNOSIS — S60.561A INSECT BITE OF RIGHT HAND, INITIAL ENCOUNTER: ICD-10-CM

## 2025-03-05 DIAGNOSIS — J01.91 ACUTE RECURRENT SINUSITIS, UNSPECIFIED LOCATION: Primary | ICD-10-CM
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
## 2025-03-05 DIAGNOSIS — R73.9 HYPERGLYCEMIA: ICD-10-CM

## 2025-03-05 LAB
CHP ED QC CHECK: ABNORMAL
GLUCOSE BLD-MCNC: 274 MG/DL
INFLUENZA VIRUS A RNA: NORMAL
INFLUENZA VIRUS B RNA: NORMAL
Lab: NORMAL
PERFORMING INSTRUMENT: NORMAL
QC PASS/FAIL: NORMAL
SARS-COV-2, POC: NORMAL

## 2025-03-05 RX ORDER — DOXYCYCLINE HYCLATE 100 MG
100 TABLET ORAL 2 TIMES DAILY
Qty: 14 TABLET | Refills: 0 | Status: SHIPPED | OUTPATIENT
Start: 2025-03-05 | End: 2025-03-12

## 2025-03-05 RX ORDER — FLUTICASONE PROPIONATE 50 MCG
2 SPRAY, SUSPENSION (ML) NASAL DAILY
Qty: 16 G | Refills: 0 | Status: SHIPPED | OUTPATIENT
Start: 2025-03-05

## 2025-03-05 RX ORDER — BENZONATATE 100 MG/1
100 CAPSULE ORAL 3 TIMES DAILY PRN
Qty: 30 CAPSULE | Refills: 0 | Status: SHIPPED | OUTPATIENT
Start: 2025-03-05 | End: 2025-03-15

## 2025-03-05 NOTE — PROGRESS NOTES
Influenza virus B RNA neg    POCT Glucose   Result Value Ref Range    POC Glucose 274 (H)     QC OK?       Vitals:    03/05/25 0934   BP: 132/82   Site: Left Upper Arm   Position: Sitting   Cuff Size: Medium Adult   Pulse: 81   Resp: 18   Temp: 97.8 °F (36.6 °C)   TempSrc: Temporal   SpO2: 96%   Weight: 84.4 kg (186 lb)   Height: 1.702 m (5' 7\")     No results found.     Results for orders placed or performed in visit on 03/05/25   POCT COVID-19, Antigen   Result Value Ref Range    SARS-COV-2, POC Not-Detected Not Detected    Lot Number [id number]     QC Pass/Fail pass     Performing Instrument BinaxNOW    POCT Influenza A/B DNA   Result Value Ref Range    Influenza virus A RNA neg     Influenza virus B RNA neg    POCT Glucose   Result Value Ref Range    POC Glucose 274 (H)     QC OK?         PROCEDURES   Unless otherwise noted below, none     Procedures    PAST MEDICAL HISTORY      has a past medical history of Benign prostatic hyperplasia with urinary obstruction (12/21/2022), Coronary artery disease due to lipid rich plaque (12/21/2022), Essential hypertension (12/21/2022), Mixed hyperlipidemia (12/21/2022), Solitary pulmonary nodule (01/04/2024), Tobacco abuse (12/21/2022), Type 2 diabetes mellitus without complications (HCC) (12/12/2023), and UTI (urinary tract infection).       FINAL IMPRESSION      1. Acute recurrent sinusitis, unspecified location    2. Hyperglycemia    3. URI with cough and congestion    4. Insect bite of right hand, initial encounter    5. Elevated blood pressure reading in office with diagnosis of hypertension              (Please note that portions of this note were completed with a voice recognition program.  Efforts were made to edit the dictations but occasionally words are mis-transcribed.)    Jorge Muñoz, RONAL - CNP (electronically signed)

## 2025-03-05 NOTE — PATIENT INSTRUCTIONS
Please follow up with PCP as discussed  Return here or go to ER for any worsening symptoms   Please take the antibiotic(s), as prescribed. Do not stop taking the medication early, even if you feel entirely well.

## 2025-03-10 ENCOUNTER — TELEPHONE (OUTPATIENT)
Dept: PRIMARY CARE CLINIC | Age: 70
End: 2025-03-10

## 2025-03-10 NOTE — TELEPHONE ENCOUNTER
He is having issues with his prostate he fills like he is still full of pee, and it hurts.  He has gone to [facility] before on this matter and does not want to go back.  Can he please set an appointment with you, or could you prescribe something we would appreciate it.   Thank you   Dana Esteban

## 2025-03-11 ENCOUNTER — OFFICE VISIT (OUTPATIENT)
Dept: PRIMARY CARE CLINIC | Age: 70
End: 2025-03-11
Payer: MEDICARE

## 2025-03-11 VITALS
TEMPERATURE: 97.1 F | DIASTOLIC BLOOD PRESSURE: 82 MMHG | SYSTOLIC BLOOD PRESSURE: 132 MMHG | HEART RATE: 82 BPM | WEIGHT: 185.2 LBS | BODY MASS INDEX: 29.01 KG/M2 | OXYGEN SATURATION: 96 %

## 2025-03-11 DIAGNOSIS — R80.8 OTHER PROTEINURIA: ICD-10-CM

## 2025-03-11 DIAGNOSIS — R35.0 URINARY FREQUENCY: Primary | ICD-10-CM

## 2025-03-11 DIAGNOSIS — R31.0 GROSS HEMATURIA: ICD-10-CM

## 2025-03-11 LAB
BACTERIA URNS QL MICRO: ABNORMAL /HPF
BILIRUB UR QL STRIP.AUTO: ABNORMAL
CLARITY UR: ABNORMAL
COLOR UR: ABNORMAL
EPI CELLS #/AREA URNS AUTO: 1 /HPF (ref 0–5)
GLUCOSE UR STRIP.AUTO-MCNC: >=1000 MG/DL
HGB UR QL STRIP.AUTO: ABNORMAL
HYALINE CASTS #/AREA URNS AUTO: 1 /LPF (ref 0–8)
KETONES UR STRIP.AUTO-MCNC: ABNORMAL MG/DL
LEUKOCYTE ESTERASE UR QL STRIP.AUTO: NEGATIVE
NITRITE UR QL STRIP.AUTO: NEGATIVE
PH UR STRIP.AUTO: 5.5 [PH] (ref 5–8)
PROT UR STRIP.AUTO-MCNC: 100 MG/DL
RBC CLUMPS #/AREA URNS AUTO: 4791 /HPF (ref 0–4)
SP GR UR STRIP.AUTO: 1.02 (ref 1–1.03)
UA DIPSTICK W REFLEX MICRO PNL UR: YES
URN SPEC COLLECT METH UR: ABNORMAL
UROBILINOGEN UR STRIP-ACNC: 1 E.U./DL
WBC #/AREA URNS AUTO: 18 /HPF (ref 0–5)

## 2025-03-11 PROCEDURE — 1123F ACP DISCUSS/DSCN MKR DOCD: CPT | Performed by: FAMILY MEDICINE

## 2025-03-11 PROCEDURE — 99214 OFFICE O/P EST MOD 30 MIN: CPT | Performed by: FAMILY MEDICINE

## 2025-03-11 PROCEDURE — G2211 COMPLEX E/M VISIT ADD ON: HCPCS | Performed by: FAMILY MEDICINE

## 2025-03-11 PROCEDURE — 3079F DIAST BP 80-89 MM HG: CPT | Performed by: FAMILY MEDICINE

## 2025-03-11 PROCEDURE — 3075F SYST BP GE 130 - 139MM HG: CPT | Performed by: FAMILY MEDICINE

## 2025-03-11 RX ORDER — GLYBURIDE 5 MG/1
5 TABLET ORAL
COMMUNITY
Start: 2025-01-24

## 2025-03-11 NOTE — PROGRESS NOTES
PROGRESS NOTE  Date of Service:  3/11/2025    SUBJECTIVE:  Patient ID: Matt Castaneda Withkendy is a 69 y.o. male    ASSESSMENT  1. Urinary frequency    2. Gross hematuria    3. Other proteinuria        PLAN:   1. Urinary frequency  -     Urinalysis  -     Culture, Urine  2. Gross hematuria  3. Other proteinuria     He feels that this is similar to an infection he has had in the past.  Will assess for culture as you can have hematuria with that however concern for lower abdominal pain as well and would recommend imaging if culture is negative  Do increase fluids overall   If symptoms worsen do recommend evaluation in the emergency department  Do complete the 24-hour urine testing after this evaluation is complete  Follow-up with test results          HPI:     He reports that for the past several weeks he has noted some lower abdominal pain.  This would come and go would not be persistent initially but recently has had more pain in his lower abdomen and felt this was similar to a bladder infection he has had in the past  No dysuria but he has had increased frequency  Has noted blood in his urine as well  No history of kidney stones    Proteinuria on last microalbumin testing he has not completed the 24-hour urine collection thus far    BPH-he sees urology has been on Flomax which she has continued  He does have occasional back pain but this is not worsened and with the symptoms  No fevers or chills  Does note some constipation but this is his usual    He was recently on an antibiotic for a sinus infection        Patient's medications, allergies, past medical, surgical, social and family histories were reviewed and updated as appropriate.     OBJECTIVE:  Vitals:    03/11/25 1100 03/11/25 1106   BP: (!) 158/88 132/82   BP Site: Left Upper Arm Left Upper Arm   Patient Position: Sitting Sitting   BP Cuff Size: Medium Adult Medium Adult   Pulse: 82    Temp: 97.1 °F (36.2 °C)    SpO2: 96%    Weight: 84 kg (185 lb 3.2 oz)

## 2025-03-12 ENCOUNTER — RESULTS FOLLOW-UP (OUTPATIENT)
Dept: PRIMARY CARE CLINIC | Age: 70
End: 2025-03-12

## 2025-03-12 DIAGNOSIS — R31.0 GROSS HEMATURIA: Primary | ICD-10-CM

## 2025-03-12 LAB — BACTERIA UR CULT: NORMAL

## 2025-03-13 DIAGNOSIS — R80.9 TYPE 2 DIABETES MELLITUS WITH DIABETIC MICROALBUMINURIA, WITHOUT LONG-TERM CURRENT USE OF INSULIN (HCC): ICD-10-CM

## 2025-03-13 DIAGNOSIS — E11.29 TYPE 2 DIABETES MELLITUS WITH DIABETIC MICROALBUMINURIA, WITHOUT LONG-TERM CURRENT USE OF INSULIN (HCC): ICD-10-CM

## 2025-03-14 LAB
CREAT 24H UR-MRATE: 1.1 G/24HR (ref 0.6–2.5)
PROT 24H UR-MRATE: 0.83 G/24HR
SPECIMEN VOL 24H UR: 850 ML

## 2025-03-15 ASSESSMENT — PATIENT HEALTH QUESTIONNAIRE - PHQ9
SUM OF ALL RESPONSES TO PHQ QUESTIONS 1-9: 0
1. LITTLE INTEREST OR PLEASURE IN DOING THINGS: NOT AT ALL
2. FEELING DOWN, DEPRESSED OR HOPELESS: NOT AT ALL

## 2025-03-17 ENCOUNTER — HOSPITAL ENCOUNTER (OUTPATIENT)
Age: 70
Discharge: HOME | End: 2025-03-17
Payer: MEDICARE

## 2025-03-17 DIAGNOSIS — R31.0 GROSS HEMATURIA: ICD-10-CM

## 2025-03-17 PROCEDURE — 74176 CT ABD & PELVIS W/O CONTRAST: CPT

## 2025-03-18 ENCOUNTER — RESULTS FOLLOW-UP (OUTPATIENT)
Age: 70
End: 2025-03-18

## 2025-03-18 DIAGNOSIS — R31.0 GROSS HEMATURIA: Primary | ICD-10-CM

## 2025-03-18 DIAGNOSIS — R93.41 ABNORMAL CT SCAN, BLADDER: ICD-10-CM

## 2025-03-19 NOTE — PROGRESS NOTES
in the absence of intravenous contrast.  1.  No evidence of renal calculi or hydronephrosis.  2.  Hyperdensity within the bladder without discrete mass. Correlate with cystoscopy if clinically indicated.  3.  Prostatomegaly.  4.  Subpleural 8 mm pulmonary nodule in the left lung base. Correlate with dedicated chest CT.    Electronically signed by Anne-Marie Silver MD          Electronically signed by BIBIANA NELSON MD on 3/20/2025 at 12:24 PM.    Please note this chart was generated using dragon dictation software.  Although every effort was made to ensure the accuracy of this automated transcription, some errors in transcription may have occurred.

## 2025-03-20 ENCOUNTER — OFFICE VISIT (OUTPATIENT)
Dept: PRIMARY CARE CLINIC | Age: 70
End: 2025-03-20
Payer: MEDICARE

## 2025-03-20 VITALS
RESPIRATION RATE: 14 BRPM | WEIGHT: 184.4 LBS | BODY MASS INDEX: 28.88 KG/M2 | OXYGEN SATURATION: 98 % | DIASTOLIC BLOOD PRESSURE: 92 MMHG | TEMPERATURE: 97.7 F | SYSTOLIC BLOOD PRESSURE: 166 MMHG | HEART RATE: 74 BPM

## 2025-03-20 DIAGNOSIS — E11.9 TYPE 2 DIABETES MELLITUS WITHOUT COMPLICATION, WITHOUT LONG-TERM CURRENT USE OF INSULIN: ICD-10-CM

## 2025-03-20 DIAGNOSIS — R31.0 GROSS HEMATURIA: Primary | ICD-10-CM

## 2025-03-20 DIAGNOSIS — R91.1 PULMONARY NODULE: ICD-10-CM

## 2025-03-20 DIAGNOSIS — I10 PRIMARY HYPERTENSION: ICD-10-CM

## 2025-03-20 DIAGNOSIS — R93.41 ABNORMAL CT SCAN, BLADDER: ICD-10-CM

## 2025-03-20 PROCEDURE — G2211 COMPLEX E/M VISIT ADD ON: HCPCS | Performed by: FAMILY MEDICINE

## 2025-03-20 PROCEDURE — 3079F DIAST BP 80-89 MM HG: CPT | Performed by: FAMILY MEDICINE

## 2025-03-20 PROCEDURE — 3077F SYST BP >= 140 MM HG: CPT | Performed by: FAMILY MEDICINE

## 2025-03-20 PROCEDURE — 1159F MED LIST DOCD IN RCRD: CPT | Performed by: FAMILY MEDICINE

## 2025-03-20 PROCEDURE — 1123F ACP DISCUSS/DSCN MKR DOCD: CPT | Performed by: FAMILY MEDICINE

## 2025-03-20 PROCEDURE — 99214 OFFICE O/P EST MOD 30 MIN: CPT | Performed by: FAMILY MEDICINE

## 2025-03-20 RX ORDER — AVOBENZONE, HOMOSALATE, OCTISALATE, OCTOCRYLENE 30; 40; 45; 26 MG/ML; MG/ML; MG/ML; MG/ML
1 CREAM TOPICAL DAILY
Qty: 100 EACH | Refills: 4 | Status: SHIPPED | OUTPATIENT
Start: 2025-03-20

## 2025-03-31 ENCOUNTER — OFFICE VISIT (OUTPATIENT)
Dept: URGENT CARE | Age: 70
End: 2025-03-31

## 2025-03-31 VITALS
TEMPERATURE: 98.1 F | SYSTOLIC BLOOD PRESSURE: 116 MMHG | DIASTOLIC BLOOD PRESSURE: 70 MMHG | OXYGEN SATURATION: 96 % | HEART RATE: 81 BPM

## 2025-03-31 DIAGNOSIS — E11.65 HYPERGLYCEMIA DUE TO DIABETES MELLITUS (HCC): Primary | ICD-10-CM

## 2025-03-31 LAB
CHP ED QC CHECK: NORMAL
GLUCOSE BLD-MCNC: 379 MG/DL

## 2025-03-31 ASSESSMENT — ENCOUNTER SYMPTOMS: COUGH: 1

## 2025-03-31 NOTE — PROGRESS NOTES
Matt Esteban (:  1955) is a 69 y.o. male,Established patient, here for evaluation of the following chief complaint(s):  Blood Sugar Problem (States he got a new meter 544 then on the one he has been using as 470, state that his sugar can't be kept down and that he runs normally around 200 , states that he has been taken OTC mucienex. )      ASSESSMENT/PLAN:    ICD-10-CM    1. Type 2 diabetes mellitus with hyperglycemia, unspecified whether long term insulin use (HCC)  E11.65 POCT Glucose        Results for orders placed or performed in visit on 25   POCT Glucose   Result Value Ref Range    POC Glucose 379     QC OK? Pass    Hyperglycemia  Fingerstick today was 379 in the clinic  Refused labs  He has an upcoming appointment with his primary care provider  Reduce carbohydrates  Stop drinking fruit juice  Reviewed the plate method with Matt and his ex wife.   She stated she will try to encourage him to decrease his carbohydrates.  No signs or symptoms of infection  He stopped the doxycycline that was ordered in urgent care on 2025 because it upset his stomach.  He is only taking 1000 mg of metformin per day. May benefit from an increase in his metformin.     If FSBS remains elevated he should go to the ED for an evaluation.   Follow up in 7 days if symptoms persist or if symptoms worsen.  Significant time spent with patient and ex wife during evaluation and education. Time spent was 39 minutes.   SUBJECTIVE/OBJECTIVE:  Picked up a new glucometer today and states FSBS today has been 544 and then 470. States ate chicken nuggets at 11:12 am 7 chicken nuggets. States doesn't feel well if his glucose is lower than 200. Has been drinking pomegranate juice in water. Also drinks juice. Eats a lot of fruit.       History provided by:  Patient          Vitals:    25 1457   BP: 116/70   BP Site: Right Upper Arm   Patient Position: Sitting   BP Cuff Size: Medium Adult   Pulse: 81   Temp: 98.1 °F

## 2025-04-02 ENCOUNTER — HOSPITAL ENCOUNTER (OUTPATIENT)
Age: 70
Discharge: HOME | End: 2025-04-02
Payer: MEDICARE

## 2025-04-02 DIAGNOSIS — R91.1 PULMONARY NODULE: ICD-10-CM

## 2025-04-02 LAB
EGFR, POC: >90 ML/MIN/1.73M2
POC CREATININE: 0.7 MG/DL (ref 0.8–1.3)

## 2025-04-02 PROCEDURE — 71260 CT THORAX DX C+: CPT

## 2025-04-02 PROCEDURE — 6360000004 HC RX CONTRAST MEDICATION: Performed by: FAMILY MEDICINE

## 2025-04-02 PROCEDURE — 82565 ASSAY OF CREATININE: CPT

## 2025-04-02 RX ORDER — IOPAMIDOL 755 MG/ML
75 INJECTION, SOLUTION INTRAVASCULAR
Status: COMPLETED | OUTPATIENT
Start: 2025-04-02 | End: 2025-04-02

## 2025-04-02 RX ADMIN — IOPAMIDOL 75 ML: 755 INJECTION, SOLUTION INTRAVENOUS at 09:34

## 2025-04-03 ENCOUNTER — RESULTS FOLLOW-UP (OUTPATIENT)
Dept: PRIMARY CARE CLINIC | Age: 70
End: 2025-04-03

## 2025-04-09 NOTE — PROGRESS NOTES
Status: He is alert and oriented to person, place, and time.      Cranial Nerves: No cranial nerve deficit.   Psychiatric:         Attention and Perception: Attention and perception normal.         Mood and Affect: Mood and affect normal.         Speech: Speech normal.         Behavior: Behavior normal. Behavior is cooperative.         Thought Content: Thought content normal.         Cognition and Memory: Cognition and memory normal.         Judgment: Judgment normal.             Electronically signed by BIBIANA NELSON MD on 4/10/2025 at 10:04 AM.    Please note this chart was generated using dragon dictation software.  Although every effort was made to ensure the accuracy of this automated transcription, some errors in transcription may have occurred.

## 2025-04-10 ENCOUNTER — OFFICE VISIT (OUTPATIENT)
Dept: PRIMARY CARE CLINIC | Age: 70
End: 2025-04-10
Payer: MEDICARE

## 2025-04-10 VITALS
WEIGHT: 185.2 LBS | SYSTOLIC BLOOD PRESSURE: 136 MMHG | BODY MASS INDEX: 29.01 KG/M2 | HEART RATE: 76 BPM | TEMPERATURE: 97.7 F | DIASTOLIC BLOOD PRESSURE: 78 MMHG | RESPIRATION RATE: 16 BRPM | OXYGEN SATURATION: 97 %

## 2025-04-10 DIAGNOSIS — E78.2 MIXED HYPERLIPIDEMIA: ICD-10-CM

## 2025-04-10 DIAGNOSIS — R80.9 TYPE 2 DIABETES MELLITUS WITH DIABETIC MICROALBUMINURIA, WITHOUT LONG-TERM CURRENT USE OF INSULIN (HCC): Primary | ICD-10-CM

## 2025-04-10 DIAGNOSIS — E11.29 TYPE 2 DIABETES MELLITUS WITH DIABETIC MICROALBUMINURIA, WITHOUT LONG-TERM CURRENT USE OF INSULIN (HCC): Primary | ICD-10-CM

## 2025-04-10 LAB
ALBUMIN SERPL-MCNC: 3.9 G/DL (ref 3.4–5)
ALP SERPL-CCNC: 124 U/L (ref 40–129)
ALT SERPL-CCNC: 51 U/L (ref 10–40)
ANION GAP SERPL CALCULATED.3IONS-SCNC: 11 MMOL/L (ref 3–16)
AST SERPL-CCNC: 32 U/L (ref 15–37)
BILIRUB DIRECT SERPL-MCNC: 0.4 MG/DL (ref 0–0.3)
BILIRUB INDIRECT SERPL-MCNC: 0.5 MG/DL (ref 0–1)
BILIRUB SERPL-MCNC: 0.9 MG/DL (ref 0–1)
BUN SERPL-MCNC: 11 MG/DL (ref 7–20)
CALCIUM SERPL-MCNC: 8.7 MG/DL (ref 8.3–10.6)
CHLORIDE SERPL-SCNC: 103 MMOL/L (ref 99–110)
CHOLEST SERPL-MCNC: 97 MG/DL (ref 0–199)
CO2 SERPL-SCNC: 24 MMOL/L (ref 21–32)
CREAT SERPL-MCNC: 0.7 MG/DL (ref 0.8–1.3)
GFR SERPLBLD CREATININE-BSD FMLA CKD-EPI: >90 ML/MIN/{1.73_M2}
GLUCOSE SERPL-MCNC: 171 MG/DL (ref 70–99)
HBA1C MFR BLD: 9.9 %
HDLC SERPL-MCNC: 26 MG/DL (ref 40–60)
LDLC SERPL CALC-MCNC: 52 MG/DL
POTASSIUM SERPL-SCNC: 4.7 MMOL/L (ref 3.5–5.1)
PROT SERPL-MCNC: 6.9 G/DL (ref 6.4–8.2)
SODIUM SERPL-SCNC: 138 MMOL/L (ref 136–145)
TRIGL SERPL-MCNC: 93 MG/DL (ref 0–150)
VLDLC SERPL CALC-MCNC: 19 MG/DL

## 2025-04-10 PROCEDURE — 36415 COLL VENOUS BLD VENIPUNCTURE: CPT | Performed by: FAMILY MEDICINE

## 2025-04-10 PROCEDURE — 3078F DIAST BP <80 MM HG: CPT | Performed by: FAMILY MEDICINE

## 2025-04-10 PROCEDURE — 3046F HEMOGLOBIN A1C LEVEL >9.0%: CPT | Performed by: FAMILY MEDICINE

## 2025-04-10 PROCEDURE — 1159F MED LIST DOCD IN RCRD: CPT | Performed by: FAMILY MEDICINE

## 2025-04-10 PROCEDURE — 83036 HEMOGLOBIN GLYCOSYLATED A1C: CPT | Performed by: FAMILY MEDICINE

## 2025-04-10 PROCEDURE — 1123F ACP DISCUSS/DSCN MKR DOCD: CPT | Performed by: FAMILY MEDICINE

## 2025-04-10 PROCEDURE — 3075F SYST BP GE 130 - 139MM HG: CPT | Performed by: FAMILY MEDICINE

## 2025-04-10 PROCEDURE — 1160F RVW MEDS BY RX/DR IN RCRD: CPT | Performed by: FAMILY MEDICINE

## 2025-04-10 PROCEDURE — 99214 OFFICE O/P EST MOD 30 MIN: CPT | Performed by: FAMILY MEDICINE

## 2025-04-10 PROCEDURE — G2211 COMPLEX E/M VISIT ADD ON: HCPCS | Performed by: FAMILY MEDICINE

## 2025-04-10 RX ORDER — INSULIN GLARGINE 100 [IU]/ML
10 INJECTION, SOLUTION SUBCUTANEOUS NIGHTLY
Qty: 5 ADJUSTABLE DOSE PRE-FILLED PEN SYRINGE | Refills: 0 | Status: SHIPPED | OUTPATIENT
Start: 2025-04-10

## 2025-04-10 RX ORDER — MONTELUKAST SODIUM 10 MG/1
TABLET ORAL
COMMUNITY
Start: 2025-04-07

## 2025-04-10 ASSESSMENT — PATIENT HEALTH QUESTIONNAIRE - PHQ9
SUM OF ALL RESPONSES TO PHQ QUESTIONS 1-9: 0
SUM OF ALL RESPONSES TO PHQ QUESTIONS 1-9: 0
2. FEELING DOWN, DEPRESSED OR HOPELESS: NOT AT ALL
SUM OF ALL RESPONSES TO PHQ QUESTIONS 1-9: 0
SUM OF ALL RESPONSES TO PHQ QUESTIONS 1-9: 0

## 2025-04-10 NOTE — PATIENT INSTRUCTIONS
anytime you think you may need emergency care. For example, call if:    You feel you cannot stop from hurting yourself or someone else.   Where to get help 24 hours a day, 7 days a week   If you or someone you know talks about suicide, self-harm, a mental health crisis, a substance use crisis, or any other kind of emotional distress, get help right away. You can:    Call the Suicide and Crisis Lifeline at 988.     Call 3-405-899-TALK (1-904.683.7572).     Text HOME to 905745 to access the Crisis Text Line.   Consider saving these numbers in your phone.  Go to [employer] for more information or to chat online.  Watch closely for changes in your health, and be sure to contact your doctor if:    Your panic attacks get worse.     You have new or different anxiety.     You are not getting better as expected.   Where can you learn more?  Go to https://www.[employer].net/patientEd and enter H601 to learn more about \"Panic Attacks: Care Instructions.\"  Current as of: June 25, 2023               Content Version: 13.9  © 8484-4916 [employer].   Care instructions adapted under license by [employer]. If you have questions about a medical condition or this instruction, always ask your healthcare professional. [employer] disclaims any warranty or liability for your use of this information.

## 2025-04-12 ENCOUNTER — RESULTS FOLLOW-UP (OUTPATIENT)
Dept: PRIMARY CARE CLINIC | Age: 70
End: 2025-04-12

## 2025-04-12 RX ORDER — EZETIMIBE 10 MG/1
10 TABLET ORAL DAILY
Qty: 90 TABLET | Refills: 1 | Status: SHIPPED | OUTPATIENT
Start: 2025-04-12

## 2025-04-12 RX ORDER — ATORVASTATIN CALCIUM 80 MG/1
80 TABLET, FILM COATED ORAL DAILY
Qty: 90 TABLET | Refills: 1 | Status: SHIPPED | OUTPATIENT
Start: 2025-04-12

## 2025-04-18 ENCOUNTER — OFFICE VISIT (OUTPATIENT)
Dept: URGENT CARE | Age: 70
End: 2025-04-18

## 2025-04-18 VITALS
HEART RATE: 91 BPM | TEMPERATURE: 98.2 F | DIASTOLIC BLOOD PRESSURE: 72 MMHG | OXYGEN SATURATION: 96 % | SYSTOLIC BLOOD PRESSURE: 126 MMHG

## 2025-04-18 DIAGNOSIS — L03.011 CELLULITIS OF RIGHT RING FINGER: Primary | ICD-10-CM

## 2025-04-18 RX ORDER — CLINDAMYCIN HYDROCHLORIDE 300 MG/1
300 CAPSULE ORAL 3 TIMES DAILY
Qty: 30 CAPSULE | Refills: 0 | Status: SHIPPED | OUTPATIENT
Start: 2025-04-18 | End: 2025-04-28

## 2025-04-18 NOTE — PATIENT INSTRUCTIONS
Matt,    Thank you for trusting [facility] with your health care needs. Your decision to come to us means a lot, and we are honored to be part of your healthcare journey.  At [facility], our dedicated team is committed to providing you with the highest quality of care in a warm and welcoming environment. Your health and well-being are our top priorities, and we appreciate the opportunity to serve you.    Thank you for choosing us, and we’re here for you whenever you need us!    Warm regards,       [facility] Team    [] Dr. Garsia [] FREDDY Woods, Supervisor       [] ERIKA Ross    [] RT Barbara    [] FREDDY Sauceda    [] FREDDY Jimenez   [] FREDDY Drew   [] FREDDY Velázquez    [] FREDDY Hugo

## 2025-04-18 NOTE — PROGRESS NOTES
Matt Esteban (: 1955) is a 69 y.o. male, Established patient, here for evaluation of the following chief complaint(s):  Insect Bite (States that this happened around Monday , swollen , red, stiff )      ASSESSMENT/PLAN:    ICD-10-CM    1. Cellulitis of right ring finger  L03.011 clindamycin (CLEOCIN) 300 MG capsule            Discussed PCP follow up for persisting or worsening symptoms, or to return to the clinic if unable to obtain PCP follow up for worsening symptoms.    The patient tolerated their visit well. The patient and/or the family were informed of the results of any tests, a time was given to answer questions, a plan was proposed and they agreed with plan. Reviewed AVS with treatment instructions and answered questions - pt/family expresses understanding and agreement with the discussed treatment plan and AVS instructions.      SUBJECTIVE/OBJECTIVE:  HPI     Insect Bite     Additional comments: States that this happened around Monday , swollen ,   red, stiff           Last edited by Anny Shirley MA on 2025  1:22 PM.            Insect Bite      VITAL SIGNS  Vitals:    25 1323   BP: 126/72   BP Site: Left Upper Arm   Patient Position: Sitting   BP Cuff Size: Medium Adult   Pulse: 91   Temp: 98.2 °F (36.8 °C)   TempSrc: Temporal   SpO2: 96%       Review of Systems  See HPI for pertinent positives and negatives.    Physical Exam  Skin:     Comments: Right ring finger with a central scab with surrounding erythema and swelling.          PROCEDURES:  Unless otherwise noted below, none     Procedures    RESULTS:  No results found for this visit on 25.  An electronic signature was used to authenticate this note.    --Wang Garsia Jr., MD

## 2025-05-01 ENCOUNTER — TELEPHONE (OUTPATIENT)
Dept: PRIMARY CARE CLINIC | Age: 70
End: 2025-05-01

## 2025-05-01 NOTE — TELEPHONE ENCOUNTER
\"Blank\" Chronic Condition Release of Information Form / [facility]    PCP: Originals Scanned to Media Mgr. and placed in MA Folder for further processing.  Please route message back to department pool and return original paperwork to FREDDY HAYES place paperwork in Reg folder for fax/scanning.

## 2025-05-02 NOTE — TELEPHONE ENCOUNTER
\"Completed\" Chronic Condition Release of Information Form / [facility]     Completed Chronic Condition Release of Information Form / [facility] Forms have been signed, scanned to Media Manager, and Faxed to [phone].    Confirmation fax received.       No further action Required.

## 2025-05-28 NOTE — PROGRESS NOTES
PROGRESS NOTE  Date of Service:  5/29/2025    SUBJECTIVE:  Patient ID: Matt Esteban is a 69 y.o. male    ASSESSMENT  1. Type 2 diabetes mellitus with diabetic microalbuminuria, without long-term current use of insulin ([facility])    2. Mixed hyperlipidemia    3. Tobacco abuse    4. COPD with acute exacerbation (HCC)        PLAN:   1. Type 2 diabetes mellitus with diabetic microalbuminuria, without long-term current use of insulin (HCC)  -     insulin glargine (LANTUS SOLOSTAR) 100 UNIT/ML injection pen; Inject 15 Units into the skin nightly, Disp-5 Adjustable Dose Pre-filled Pen Syringe, R-0Normal  2. Mixed hyperlipidemia  3. Tobacco abuse  -     nicotine (NICODERM CQ) 7 MG/24HR; Place 1 patch onto the skin daily, Disp-14 patch, R-2Normal  4. COPD with acute exacerbation (HCC)  -     predniSONE (DELTASONE) 20 MG tablet; Take 2 tablets by mouth daily for 5 days, Disp-10 tablet, R-0Normal  -     doxycycline hyclate (VIBRA-TABS) 100 MG tablet; Take 1 tablet by mouth 2 times daily for 10 days, Disp-20 tablet, R-0Normal  -     albuterol sulfate HFA (VENTOLIN HFA) 108 (90 Base) MCG/ACT inhaler; Inhale 2 puffs into the lungs 4 times daily as needed for Wheezing, Disp-18 g, R-5Normal  -     benzonatate (TESSALON) 200 MG capsule; Take 1 capsule by mouth 3 times daily as needed for Cough, Disp-30 capsule, R-0Normal  Do recommend increasing his insulin to 15 units each night  Other medications have been expensive he will check to see if he has access to his formulary for best cost medications  COPD exacerbation begin medication regimen and monitor for improvement.  Wheeze does clear with cough and no need for an office nebulizer today  With repeated episodes do recommend that we consider use of inhaled treatments for COPD to help decrease the likelihood of recurrence.  He wants to hold off on this for now and will reconsider at our next visit    Return in about 7 weeks (around 7/17/2025).          HPI:

## 2025-05-29 ENCOUNTER — TELEPHONE (OUTPATIENT)
Dept: PRIMARY CARE CLINIC | Age: 70
End: 2025-05-29

## 2025-05-29 ENCOUNTER — OFFICE VISIT (OUTPATIENT)
Dept: PRIMARY CARE CLINIC | Age: 70
End: 2025-05-29
Payer: MEDICARE

## 2025-05-29 VITALS
RESPIRATION RATE: 14 BRPM | DIASTOLIC BLOOD PRESSURE: 76 MMHG | OXYGEN SATURATION: 96 % | TEMPERATURE: 98.1 F | HEART RATE: 81 BPM | BODY MASS INDEX: 28.51 KG/M2 | SYSTOLIC BLOOD PRESSURE: 120 MMHG | WEIGHT: 182 LBS

## 2025-05-29 DIAGNOSIS — E11.29 TYPE 2 DIABETES MELLITUS WITH DIABETIC MICROALBUMINURIA, WITHOUT LONG-TERM CURRENT USE OF INSULIN (HCC): Primary | ICD-10-CM

## 2025-05-29 DIAGNOSIS — R80.9 TYPE 2 DIABETES MELLITUS WITH DIABETIC MICROALBUMINURIA, WITHOUT LONG-TERM CURRENT USE OF INSULIN (HCC): Primary | ICD-10-CM

## 2025-05-29 DIAGNOSIS — Z72.0 TOBACCO ABUSE: ICD-10-CM

## 2025-05-29 DIAGNOSIS — E78.2 MIXED HYPERLIPIDEMIA: ICD-10-CM

## 2025-05-29 DIAGNOSIS — J44.1 COPD WITH ACUTE EXACERBATION (HCC): ICD-10-CM

## 2025-05-29 PROCEDURE — G8427 DOCREV CUR MEDS BY ELIG CLIN: HCPCS | Performed by: FAMILY MEDICINE

## 2025-05-29 PROCEDURE — 3046F HEMOGLOBIN A1C LEVEL >9.0%: CPT | Performed by: FAMILY MEDICINE

## 2025-05-29 PROCEDURE — 99214 OFFICE O/P EST MOD 30 MIN: CPT | Performed by: FAMILY MEDICINE

## 2025-05-29 PROCEDURE — G2211 COMPLEX E/M VISIT ADD ON: HCPCS | Performed by: FAMILY MEDICINE

## 2025-05-29 PROCEDURE — 4004F PT TOBACCO SCREEN RCVD TLK: CPT | Performed by: FAMILY MEDICINE

## 2025-05-29 PROCEDURE — 3074F SYST BP LT 130 MM HG: CPT | Performed by: FAMILY MEDICINE

## 2025-05-29 PROCEDURE — 3023F SPIROM DOC REV: CPT | Performed by: FAMILY MEDICINE

## 2025-05-29 PROCEDURE — 3017F COLORECTAL CA SCREEN DOC REV: CPT | Performed by: FAMILY MEDICINE

## 2025-05-29 PROCEDURE — 1123F ACP DISCUSS/DSCN MKR DOCD: CPT | Performed by: FAMILY MEDICINE

## 2025-05-29 PROCEDURE — 3078F DIAST BP <80 MM HG: CPT | Performed by: FAMILY MEDICINE

## 2025-05-29 PROCEDURE — G8419 CALC BMI OUT NRM PARAM NOF/U: HCPCS | Performed by: FAMILY MEDICINE

## 2025-05-29 PROCEDURE — 2022F DILAT RTA XM EVC RTNOPTHY: CPT | Performed by: FAMILY MEDICINE

## 2025-05-29 RX ORDER — DOXYCYCLINE HYCLATE 100 MG
100 TABLET ORAL 2 TIMES DAILY
Qty: 20 TABLET | Refills: 0 | Status: SHIPPED | OUTPATIENT
Start: 2025-05-29 | End: 2025-06-08

## 2025-05-29 RX ORDER — INSULIN GLARGINE 100 [IU]/ML
INJECTION, SOLUTION SUBCUTANEOUS NIGHTLY
Qty: 5 ADJUSTABLE DOSE PRE-FILLED PEN SYRINGE | Refills: 3 | Status: CANCELLED | OUTPATIENT
Start: 2025-05-29

## 2025-05-29 RX ORDER — PREDNISONE 20 MG/1
40 TABLET ORAL DAILY
Qty: 10 TABLET | Refills: 0 | Status: SHIPPED | OUTPATIENT
Start: 2025-05-29 | End: 2025-06-03

## 2025-05-29 RX ORDER — ALBUTEROL SULFATE 90 UG/1
2 INHALANT RESPIRATORY (INHALATION) 4 TIMES DAILY PRN
Qty: 18 G | Refills: 5 | Status: SHIPPED | OUTPATIENT
Start: 2025-05-29

## 2025-05-29 RX ORDER — INSULIN GLARGINE 100 [IU]/ML
15 INJECTION, SOLUTION SUBCUTANEOUS NIGHTLY
Qty: 5 ADJUSTABLE DOSE PRE-FILLED PEN SYRINGE | Refills: 0 | Status: SHIPPED | OUTPATIENT
Start: 2025-05-29

## 2025-05-29 RX ORDER — BENZONATATE 200 MG/1
200 CAPSULE ORAL 3 TIMES DAILY PRN
Qty: 30 CAPSULE | Refills: 0 | Status: SHIPPED | OUTPATIENT
Start: 2025-05-29 | End: 2025-06-08

## 2025-06-04 ASSESSMENT — PATIENT HEALTH QUESTIONNAIRE - PHQ9
2. FEELING DOWN, DEPRESSED OR HOPELESS: NOT AT ALL
SUM OF ALL RESPONSES TO PHQ QUESTIONS 1-9: 0
1. LITTLE INTEREST OR PLEASURE IN DOING THINGS: NOT AT ALL
SUM OF ALL RESPONSES TO PHQ QUESTIONS 1-9: 0

## 2025-07-02 ENCOUNTER — OFFICE VISIT (OUTPATIENT)
Dept: URGENT CARE | Age: 70
End: 2025-07-02

## 2025-07-02 VITALS
OXYGEN SATURATION: 96 % | HEART RATE: 85 BPM | WEIGHT: 182 LBS | BODY MASS INDEX: 28.51 KG/M2 | DIASTOLIC BLOOD PRESSURE: 70 MMHG | SYSTOLIC BLOOD PRESSURE: 103 MMHG

## 2025-07-02 DIAGNOSIS — S60.552A: Primary | ICD-10-CM

## 2025-07-02 RX ORDER — GLYBURIDE 5 MG/1
5 TABLET ORAL
COMMUNITY
Start: 2025-01-24

## 2025-07-02 RX ORDER — CEPHALEXIN 500 MG/1
500 CAPSULE ORAL 2 TIMES DAILY
Qty: 14 CAPSULE | Refills: 0 | Status: CANCELLED | OUTPATIENT
Start: 2025-07-02 | End: 2025-07-09

## 2025-07-02 RX ORDER — DOXYCYCLINE HYCLATE 100 MG
100 TABLET ORAL 2 TIMES DAILY
Qty: 14 TABLET | Refills: 0 | Status: SHIPPED | OUTPATIENT
Start: 2025-07-02 | End: 2025-07-09

## 2025-07-02 NOTE — PATIENT INSTRUCTIONS
Prescribed doxycycline twice dialy for 7 days. Take medication until completed.     Wash hand daily with soap and water.  Recommend applying over-the-counter topical antibiotic ointment to keep area moist and improve healing.      Watch for signs and symptoms of infection: Increased redness, increased pain, purulent drainage (thick white, yellow, green), or foul drainage.    May take tylenol and ibuprofen as needed for pain control.     Follow up in 3-5 days if symptoms worsen or fail to improve.

## 2025-07-02 NOTE — PROGRESS NOTES
Matt Esteban (:  1955) is a 69 y.o. male,Established patient, here for evaluation of the following chief complaint(s):  Hand Injury (Left hand injury, fish hook stuck in hand)      Assessment & Plan :  Visit Diagnoses and Associated Orders         Metal foreign body in hand, left, initial encounter    -  Primary    FOREIGN BODY REMOVAL [PRO84 Custom]      doxycycline hyclate (VIBRA-TABS) 100 MG tablet [2625]           ORDERS WITHOUT AN ASSOCIATED DIAGNOSIS    glyBURIDE (DIABETA) 5 MG tablet [3489]              Patient was seen and evaluated for above injury. Discussed with patient procedure of removing foreign body. Patient verbalized and agreed to procedure. See procedure note. Foreign body of fishing hook removed from left hand with no complications. Patient wound dressed with antibiotic ointment and Band-Aid. Prescribed doxycycline twice daily for 7 days. Instructed to Take medication until completed. Instructed to Wash hand daily with soap and water.  Recommend applying over-the-counter topical antibiotic ointment to keep area moist and improve healing.  Watch for signs and symptoms of infection: Increased redness, increased pain, purulent drainage (thick white, yellow, green), or foul drainage. May take tylenol and ibuprofen as needed for pain control. Instructed to Follow up in 3-5 days if symptoms worsen or fail to improve. Patient verbalized and agreed to plan of care.        Subjective :    Hand Injury          69 y.o. male presents with symptoms of fishing hook in hand. Reports, he was helping a buddy out and getting a fishing pole out of the vehicle. Reports he thought he had a hold of the hook when he was poked by one of the two barbs on the hook into the left mung[place] surface. Report he tried to remove it himself but unable due to the pain.          Vitals:    25 1822   BP: 103/70   Pulse: 85   SpO2: 96%   Weight: 82.6 kg (182 lb)       No results found for this visit on 25.

## 2025-07-14 NOTE — TELEPHONE ENCOUNTER
Fax received from [facility] pharmacy stating that the insulin glargine (LANTUS SOLOSTAR) 100 UNIT/ML injection pen is not covered by insurance. Pharmacy requesting Lantus Solostar 100 unit/mL to be sent in.     Form scanned into media and attached to encounter for viewing purposes.     Medication:   Requested Prescriptions     Pending Prescriptions Disp Refills    insulin glargine (LANTUS SOLOSTAR) 100 UNIT/ML injection pen 5 Adjustable Dose Pre-filled Pen Syringe 3     Sig: Inject into the skin nightly       Last Filled:      Patient Phone Number: [phone] (home)     Last appt: 5/29/2025   Next appt: 7/17/2025    Last Labs DM:   Lab Results   Component Value Date/Time    LABA1C 9.9 04/10/2025 09:38 AM         
Fax received from [facility] pharmacy stating that the insulin glargine (LANTUS SOLOSTAR) 100 UNIT/ML injection pen is not covered by insurance. Pharmacy requesting Lantus Solostar 100 unit/mL to be sent in.     Form scanned into media and attached to encounter for viewing purposes.   
Please call as I am perplexed. The lantus solostar- sent last time and today- is not covered but they want me to send in a new rx for lantus solostar?  
Spoke with Karol at pharmacy, She advises to please disregard this fax - issue was resolved and patient is able to get Lantus     No further action is needed for this encounter.    
Speaking Coherently

## 2025-07-16 NOTE — PROGRESS NOTES
PROGRESS NOTE  Date of Service:  7/17/2025    SUBJECTIVE:  Patient ID: Matt Esteban is a 69 y.o. male    ASSESSMENT  1. Type 2 diabetes mellitus with diabetic microalbuminuria, without long-term current use of insulin ([facility])    2. Essential hypertension    3. Mixed hyperlipidemia        PLAN:   1. Type 2 diabetes mellitus with diabetic microalbuminuria, without long-term current use of insulin ([facility])  -     POCT glycosylated hemoglobin (Hb A1C)  -     insulin glargine (LANTUS SOLOSTAR) 100 UNIT/ML injection pen; Inject 15 Units into the skin nightly, Disp-5 Adjustable Dose Pre-filled Pen Syringe, R-0Normal  -     metFORMIN (GLUCOPHAGE) 1000 MG tablet; Take 1 tablet by mouth 2 times daily (with meals), Disp-180 tablet, R-0Normal  2. Essential hypertension  -     lisinopril (PRINIVIL;ZESTRIL) 20 MG tablet; Take 1 tablet by mouth daily, Disp-90 tablet, R-0Normal  3. Mixed hyperlipidemia     Assessment & Plan  1. Hypertension.  - Blood pressure slightly elevated today.  - Dosage of lisinopril will be increased from 10 mg to 20 mg to achieve better control.      2. Diabetes mellitus.  - Hemoglobin A1c level is 7.4, indicating good control down from 9.9  - Continue current regimen of 15 units of insulin at night and metformin.  - Advised to monitor sugar intake, especially foods high in sugar like cakes and chocolate.  - Discussed the importance of maintaining current medication and dietary habits.    3. Cholesterol management.  - Cholesterol levels checked in 04/2025 and were within normal limits.  - Currently on atorvastatin 80 mg.  - No recheck needed today; reassessment planned for next visit.  - Emphasized the importance of continuing medication and monitoring dietary habits.    Follow-up  - Follow-up scheduled in 3 months.     Return in about 3 months (around 10/17/2025).          HPI:     History of Present Illness  The patient presents for evaluation of diabetes and hypertension.    He reports satisfactory

## 2025-07-17 ENCOUNTER — OFFICE VISIT (OUTPATIENT)
Dept: PRIMARY CARE CLINIC | Age: 70
End: 2025-07-17

## 2025-07-17 VITALS
DIASTOLIC BLOOD PRESSURE: 82 MMHG | SYSTOLIC BLOOD PRESSURE: 138 MMHG | BODY MASS INDEX: 28.1 KG/M2 | RESPIRATION RATE: 16 BRPM | HEART RATE: 75 BPM | TEMPERATURE: 97.7 F | WEIGHT: 179.4 LBS | OXYGEN SATURATION: 98 %

## 2025-07-17 DIAGNOSIS — E11.29 TYPE 2 DIABETES MELLITUS WITH DIABETIC MICROALBUMINURIA, WITHOUT LONG-TERM CURRENT USE OF INSULIN (HCC): Primary | ICD-10-CM

## 2025-07-17 DIAGNOSIS — R80.9 TYPE 2 DIABETES MELLITUS WITH DIABETIC MICROALBUMINURIA, WITHOUT LONG-TERM CURRENT USE OF INSULIN (HCC): Primary | ICD-10-CM

## 2025-07-17 DIAGNOSIS — I10 ESSENTIAL HYPERTENSION: ICD-10-CM

## 2025-07-17 DIAGNOSIS — E78.2 MIXED HYPERLIPIDEMIA: ICD-10-CM

## 2025-07-17 LAB — HBA1C MFR BLD: 7.4 %

## 2025-07-17 RX ORDER — EZETIMIBE 10 MG/1
10 TABLET ORAL DAILY
Qty: 90 TABLET | Refills: 0 | Status: SHIPPED | OUTPATIENT
Start: 2025-07-17

## 2025-07-17 RX ORDER — INSULIN GLARGINE 100 [IU]/ML
15 INJECTION, SOLUTION SUBCUTANEOUS NIGHTLY
Qty: 5 ADJUSTABLE DOSE PRE-FILLED PEN SYRINGE | Refills: 0 | Status: SHIPPED | OUTPATIENT
Start: 2025-07-17

## 2025-07-17 RX ORDER — ATORVASTATIN CALCIUM 80 MG/1
80 TABLET, FILM COATED ORAL DAILY
Qty: 90 TABLET | Refills: 0 | Status: SHIPPED | OUTPATIENT
Start: 2025-07-17

## 2025-07-17 RX ORDER — LISINOPRIL 20 MG/1
20 TABLET ORAL DAILY
Qty: 90 TABLET | Refills: 0 | Status: SHIPPED | OUTPATIENT
Start: 2025-07-17

## 2025-07-17 ASSESSMENT — PATIENT HEALTH QUESTIONNAIRE - PHQ9
SUM OF ALL RESPONSES TO PHQ QUESTIONS 1-9: 0
SUM OF ALL RESPONSES TO PHQ QUESTIONS 1-9: 0
2. FEELING DOWN, DEPRESSED OR HOPELESS: NOT AT ALL
1. LITTLE INTEREST OR PLEASURE IN DOING THINGS: NOT AT ALL
SUM OF ALL RESPONSES TO PHQ QUESTIONS 1-9: 0
SUM OF ALL RESPONSES TO PHQ QUESTIONS 1-9: 0

## 2025-08-30 DIAGNOSIS — E11.29 TYPE 2 DIABETES MELLITUS WITH DIABETIC MICROALBUMINURIA, WITHOUT LONG-TERM CURRENT USE OF INSULIN (HCC): ICD-10-CM

## 2025-08-30 DIAGNOSIS — R80.9 TYPE 2 DIABETES MELLITUS WITH DIABETIC MICROALBUMINURIA, WITHOUT LONG-TERM CURRENT USE OF INSULIN (HCC): ICD-10-CM

## 2025-09-01 RX ORDER — INSULIN GLARGINE 100 [IU]/ML
10 INJECTION, SOLUTION SUBCUTANEOUS NIGHTLY
Qty: 5 ADJUSTABLE DOSE PRE-FILLED PEN SYRINGE | Refills: 1 | Status: SHIPPED | OUTPATIENT
Start: 2025-09-01